# Patient Record
Sex: FEMALE | Race: WHITE | NOT HISPANIC OR LATINO | Employment: FULL TIME | URBAN - NONMETROPOLITAN AREA
[De-identification: names, ages, dates, MRNs, and addresses within clinical notes are randomized per-mention and may not be internally consistent; named-entity substitution may affect disease eponyms.]

---

## 2024-07-07 ENCOUNTER — APPOINTMENT (OUTPATIENT)
Dept: RADIOLOGY | Facility: HOSPITAL | Age: 29
End: 2024-07-07

## 2024-07-07 ENCOUNTER — HOSPITAL ENCOUNTER (EMERGENCY)
Facility: HOSPITAL | Age: 29
Discharge: HOME | End: 2024-07-07
Attending: FAMILY MEDICINE

## 2024-07-07 VITALS
TEMPERATURE: 98 F | DIASTOLIC BLOOD PRESSURE: 85 MMHG | WEIGHT: 190 LBS | BODY MASS INDEX: 32.44 KG/M2 | OXYGEN SATURATION: 99 % | HEIGHT: 64 IN | RESPIRATION RATE: 16 BRPM | SYSTOLIC BLOOD PRESSURE: 129 MMHG | HEART RATE: 73 BPM

## 2024-07-07 DIAGNOSIS — S93.401A SPRAIN OF RIGHT ANKLE, UNSPECIFIED LIGAMENT, INITIAL ENCOUNTER: ICD-10-CM

## 2024-07-07 DIAGNOSIS — S93.601A SPRAIN OF RIGHT FOOT, INITIAL ENCOUNTER: Primary | ICD-10-CM

## 2024-07-07 PROCEDURE — 99284 EMERGENCY DEPT VISIT MOD MDM: CPT

## 2024-07-07 PROCEDURE — 73610 X-RAY EXAM OF ANKLE: CPT | Mod: RT

## 2024-07-07 PROCEDURE — 73610 X-RAY EXAM OF ANKLE: CPT | Mod: RIGHT SIDE | Performed by: RADIOLOGY

## 2024-07-07 PROCEDURE — 73630 X-RAY EXAM OF FOOT: CPT | Mod: RIGHT SIDE | Performed by: RADIOLOGY

## 2024-07-07 PROCEDURE — 73630 X-RAY EXAM OF FOOT: CPT | Mod: RT

## 2024-07-07 ASSESSMENT — PAIN DESCRIPTION - DESCRIPTORS: DESCRIPTORS: THROBBING

## 2024-07-07 ASSESSMENT — COLUMBIA-SUICIDE SEVERITY RATING SCALE - C-SSRS
1. IN THE PAST MONTH, HAVE YOU WISHED YOU WERE DEAD OR WISHED YOU COULD GO TO SLEEP AND NOT WAKE UP?: NO
2. HAVE YOU ACTUALLY HAD ANY THOUGHTS OF KILLING YOURSELF?: NO
6. HAVE YOU EVER DONE ANYTHING, STARTED TO DO ANYTHING, OR PREPARED TO DO ANYTHING TO END YOUR LIFE?: NO

## 2024-07-07 ASSESSMENT — PAIN DESCRIPTION - LOCATION: LOCATION: FOOT

## 2024-07-07 ASSESSMENT — PAIN SCALES - GENERAL: PAINLEVEL_OUTOF10: 7

## 2024-07-07 ASSESSMENT — PAIN DESCRIPTION - ORIENTATION: ORIENTATION: RIGHT

## 2024-07-07 ASSESSMENT — PAIN - FUNCTIONAL ASSESSMENT: PAIN_FUNCTIONAL_ASSESSMENT: 0-10

## 2024-07-07 NOTE — ED TRIAGE NOTES
Pt states yesterday she rolled her R ankle and then when she went to regain her balance, her toes went under her foot, causing pain on the top of the foot. Pt is able to bear weight on the foot, has full ROM. Pt took tylenol at 0830

## 2024-07-07 NOTE — DISCHARGE INSTRUCTIONS
As discussed x-ray showed no fracture or dislocation final report from radiologist pending gentle cool compress affected area Tylenol Motrin for pain and ache if symptoms persist follow-up with orthopedic surgeon.  Also see your primary care physician follow-up.

## 2024-07-07 NOTE — Clinical Note
Diego Camarillo was seen and treated in our emergency department on 7/7/2024.  She may return to work on 07/09/2024.       If you have any questions or concerns, please don't hesitate to call.      Aleyda Andrade MD

## 2024-07-07 NOTE — ED PROVIDER NOTES
HPI   Chief Complaint   Patient presents with    Foot Injury     Pt states yesterday she rolled her R ankle and then when she went to regain her balance, her toes went under her foot, causing pain on the top of the foot. Pt is able to bear weight on the foot, has full ROM. Pt took tylenol at 0830    Ankle Pain       HPI  This 28-year-old female patient came to the emergency room complaining of right ankle and foot pain mostly in the ankle area, she rolled her right ankle when she tried to regain her balance she complains of injury to right foot causing pain in the toes as well as some pain in the ankle.  Pain is worse with range of motion of weightbearing.     Family history: Reviewed  Social history: Reviewed, denies substance abuse.  Review of system: 10 review of system obtained review of systems described in HPI otherwise negative.             Jose Coma Scale Score: 15                     Patient History   History reviewed. No pertinent past medical history.  History reviewed. No pertinent surgical history.  No family history on file.  Social History     Tobacco Use    Smoking status: Never    Smokeless tobacco: Never   Substance Use Topics    Alcohol use: Never    Drug use: Not on file       Physical Exam   ED Triage Vitals [07/07/24 1150]   Temperature Heart Rate Respirations BP   36.7 °C (98 °F) 73 16 129/85      Pulse Ox Temp Source Heart Rate Source Patient Position   99 % Temporal -- --      BP Location FiO2 (%)     -- --       Physical Exam  Constitutional:       Appearance: Normal appearance.      Comments: Patient is awake alert pleasant cooperative petechia right ankle otherwise no evidence of discomfort or distress   HENT:      Head: Normocephalic and atraumatic.      Comments: Head was normocephalic atraumatic no ear nose discharge no facial drooping or drooling no stridor     Right Ear: External ear normal.      Left Ear: External ear normal.      Nose: Nose normal.      Mouth/Throat:      Mouth:  Mucous membranes are moist.   Eyes:      Extraocular Movements: Extraocular movements intact.      Conjunctiva/sclera: Conjunctivae normal.      Pupils: Pupils are equal, round, and reactive to light.   Neck:      Vascular: No carotid bruit.   Cardiovascular:      Rate and Rhythm: Normal rate and regular rhythm.      Pulses: Normal pulses.      Heart sounds: Normal heart sounds. No murmur heard.     No friction rub. No gallop.      Comments: Regular rate and rhythm.  No friction rub no murmur no JVD good peripheral pulses no peripheral edema  Pulmonary:      Effort: Pulmonary effort is normal. No respiratory distress.      Breath sounds: Normal breath sounds. No stridor. No rales.      Comments: Clear breath sound bilaterally without wheezing rales or rhonchi no tachypnea hypoxemia respite distress.  Abdominal:      General: Abdomen is flat. Bowel sounds are normal.      Palpations: Abdomen is soft.      Comments: Abdomen soft positive bowel sound nontender no guarding rebound surgery.   Musculoskeletal:         General: No swelling, tenderness, deformity or signs of injury. Normal range of motion.      Cervical back: Normal range of motion and neck supple. No rigidity or tenderness.      Right lower leg: No edema.      Left lower leg: No edema.      Comments: .  Tarsometatarsal navicular area was nontender.  Mild diffuse tenderness in distal right foot some discomfort right ankle laterally..  Able to wiggle his toes intact DP PT pulses bilaterally..  Achilles tendon intact.  Ankle and mild discomfort more laterally.  Achilles intact no instability ankle joint noted.  Good motion.  Ankle joint bilaterally.  Right ankle examination cause some discomfort though.  Cervical thoracic lumbar spine nontender neck is supple both upper extremity good motion nontender.   Lymphadenopathy:      Cervical: No cervical adenopathy.   Neurological:      General: No focal deficit present.      Mental Status: She is alert and oriented  to person, place, and time.      Cranial Nerves: No cranial nerve deficit.      Sensory: No sensory deficit.      Motor: No weakness.      Coordination: Coordination normal.      Gait: Gait normal.      Deep Tendon Reflexes: Reflexes normal.   Psychiatric:         Mood and Affect: Mood normal.         Behavior: Behavior normal.         ED Course & MDM   Diagnoses as of 08/05/24 2315   Sprain of right foot, initial encounter   Sprain of right ankle, unspecified ligament, initial encounter       Medical Decision Making  Patient reported emergency room with a complaint of right ankle injury noted a mild discomfort upon palpation ankle however tarsometatarsal navicular area was nontender able to wiggle her toes intact DP PT pulses.  Calf is nontender Achilles intact good motion hip joint as well as bilateral knee joint spine.  Cervical thoracic and lumbar spine nontender neck was supple both upper EXTR during motion nontender.  No evidence of foot drop.    Cardiovascular examination unremarkable chest wall nontender abdomen soft and nontender clear breath sound bilaterally without wheezing rales.  Heart regular rate and rhythm.  Tach intact distal pulse intact sensation  Right foot and ankle x-ray showed no fracture or dislocation.  She was treated with an air splint rest increase fluid Tylenol for pain if any problem concern return.  Follow-up primary care physician as well as orthopedic surgeon.  Splint applied by the nursing staff checked the neurofunction intact neurovascular function and good immobilization.  Discharge home stable  Procedure  Procedures     Aleyda Andrade MD  08/05/24 5421       Aleyda Andrade MD  08/27/24 0146

## 2024-09-20 ENCOUNTER — HOSPITAL ENCOUNTER (EMERGENCY)
Facility: HOSPITAL | Age: 29
Discharge: HOME | End: 2024-09-20

## 2024-09-20 ENCOUNTER — APPOINTMENT (OUTPATIENT)
Dept: RADIOLOGY | Facility: HOSPITAL | Age: 29
End: 2024-09-20

## 2024-09-20 VITALS
HEART RATE: 100 BPM | HEIGHT: 63 IN | BODY MASS INDEX: 33.66 KG/M2 | SYSTOLIC BLOOD PRESSURE: 143 MMHG | DIASTOLIC BLOOD PRESSURE: 87 MMHG | RESPIRATION RATE: 12 BRPM | WEIGHT: 190 LBS | TEMPERATURE: 97.9 F | OXYGEN SATURATION: 98 %

## 2024-09-20 DIAGNOSIS — M54.50 ACUTE LEFT-SIDED LOW BACK PAIN WITHOUT SCIATICA: Primary | ICD-10-CM

## 2024-09-20 DIAGNOSIS — R39.9 URINARY TRACT INFECTION SYMPTOMS: ICD-10-CM

## 2024-09-20 LAB
ALBUMIN SERPL BCP-MCNC: 4.7 G/DL (ref 3.4–5)
ALP SERPL-CCNC: 62 U/L (ref 33–110)
ALT SERPL W P-5'-P-CCNC: 10 U/L (ref 7–45)
ANION GAP SERPL CALC-SCNC: 12 MMOL/L (ref 10–20)
APPEARANCE UR: CLEAR
AST SERPL W P-5'-P-CCNC: 18 U/L (ref 9–39)
B-HCG SERPL-ACNC: <2 MIU/ML
BACTERIA #/AREA URNS AUTO: ABNORMAL /HPF
BASOPHILS # BLD AUTO: 0.07 X10*3/UL (ref 0–0.1)
BASOPHILS NFR BLD AUTO: 1 %
BILIRUB SERPL-MCNC: 0.9 MG/DL (ref 0–1.2)
BILIRUB UR STRIP.AUTO-MCNC: NEGATIVE MG/DL
BUN SERPL-MCNC: 8 MG/DL (ref 6–23)
CALCIUM SERPL-MCNC: 9.8 MG/DL (ref 8.6–10.3)
CHLORIDE SERPL-SCNC: 104 MMOL/L (ref 98–107)
CO2 SERPL-SCNC: 26 MMOL/L (ref 21–32)
COLOR UR: YELLOW
CREAT SERPL-MCNC: 0.78 MG/DL (ref 0.5–1.05)
EGFRCR SERPLBLD CKD-EPI 2021: >90 ML/MIN/1.73M*2
EOSINOPHIL # BLD AUTO: 0.04 X10*3/UL (ref 0–0.7)
EOSINOPHIL NFR BLD AUTO: 0.6 %
ERYTHROCYTE [DISTWIDTH] IN BLOOD BY AUTOMATED COUNT: 12 % (ref 11.5–14.5)
GLUCOSE SERPL-MCNC: 103 MG/DL (ref 74–99)
GLUCOSE UR STRIP.AUTO-MCNC: NORMAL MG/DL
HCT VFR BLD AUTO: 41.8 % (ref 36–46)
HGB BLD-MCNC: 14.6 G/DL (ref 12–16)
IMM GRANULOCYTES # BLD AUTO: 0.02 X10*3/UL (ref 0–0.7)
IMM GRANULOCYTES NFR BLD AUTO: 0.3 % (ref 0–0.9)
KETONES UR STRIP.AUTO-MCNC: ABNORMAL MG/DL
LEUKOCYTE ESTERASE UR QL STRIP.AUTO: NEGATIVE
LYMPHOCYTES # BLD AUTO: 2.31 X10*3/UL (ref 1.2–4.8)
LYMPHOCYTES NFR BLD AUTO: 33.9 %
MAGNESIUM SERPL-MCNC: 2.05 MG/DL (ref 1.6–2.4)
MCH RBC QN AUTO: 31.3 PG (ref 26–34)
MCHC RBC AUTO-ENTMCNC: 34.9 G/DL (ref 32–36)
MCV RBC AUTO: 90 FL (ref 80–100)
MONOCYTES # BLD AUTO: 0.6 X10*3/UL (ref 0.1–1)
MONOCYTES NFR BLD AUTO: 8.8 %
MUCOUS THREADS #/AREA URNS AUTO: ABNORMAL /LPF
NEUTROPHILS # BLD AUTO: 3.77 X10*3/UL (ref 1.2–7.7)
NEUTROPHILS NFR BLD AUTO: 55.4 %
NITRITE UR QL STRIP.AUTO: NEGATIVE
NRBC BLD-RTO: 0 /100 WBCS (ref 0–0)
PH UR STRIP.AUTO: 6.5 [PH]
PLATELET # BLD AUTO: 315 X10*3/UL (ref 150–450)
POTASSIUM SERPL-SCNC: 4.6 MMOL/L (ref 3.5–5.3)
PROT SERPL-MCNC: 7.6 G/DL (ref 6.4–8.2)
PROT UR STRIP.AUTO-MCNC: ABNORMAL MG/DL
RBC # BLD AUTO: 4.66 X10*6/UL (ref 4–5.2)
RBC # UR STRIP.AUTO: NEGATIVE /UL
RBC #/AREA URNS AUTO: ABNORMAL /HPF
SODIUM SERPL-SCNC: 137 MMOL/L (ref 136–145)
SP GR UR STRIP.AUTO: 1.03
SQUAMOUS #/AREA URNS AUTO: ABNORMAL /HPF
UROBILINOGEN UR STRIP.AUTO-MCNC: ABNORMAL MG/DL
WBC # BLD AUTO: 6.8 X10*3/UL (ref 4.4–11.3)
WBC #/AREA URNS AUTO: ABNORMAL /HPF

## 2024-09-20 PROCEDURE — 85025 COMPLETE CBC W/AUTO DIFF WBC: CPT

## 2024-09-20 PROCEDURE — 2500000004 HC RX 250 GENERAL PHARMACY W/ HCPCS (ALT 636 FOR OP/ED)

## 2024-09-20 PROCEDURE — 81001 URINALYSIS AUTO W/SCOPE: CPT

## 2024-09-20 PROCEDURE — 74176 CT ABD & PELVIS W/O CONTRAST: CPT

## 2024-09-20 PROCEDURE — 99285 EMERGENCY DEPT VISIT HI MDM: CPT | Mod: 25

## 2024-09-20 PROCEDURE — 83735 ASSAY OF MAGNESIUM: CPT

## 2024-09-20 PROCEDURE — 80053 COMPREHEN METABOLIC PANEL: CPT

## 2024-09-20 PROCEDURE — 93975 VASCULAR STUDY: CPT

## 2024-09-20 PROCEDURE — 74176 CT ABD & PELVIS W/O CONTRAST: CPT | Performed by: RADIOLOGY

## 2024-09-20 PROCEDURE — 76830 TRANSVAGINAL US NON-OB: CPT | Performed by: RADIOLOGY

## 2024-09-20 PROCEDURE — 96361 HYDRATE IV INFUSION ADD-ON: CPT

## 2024-09-20 PROCEDURE — 36415 COLL VENOUS BLD VENIPUNCTURE: CPT

## 2024-09-20 PROCEDURE — 84702 CHORIONIC GONADOTROPIN TEST: CPT

## 2024-09-20 PROCEDURE — 96375 TX/PRO/DX INJ NEW DRUG ADDON: CPT

## 2024-09-20 PROCEDURE — 76856 US EXAM PELVIC COMPLETE: CPT

## 2024-09-20 PROCEDURE — 96374 THER/PROPH/DIAG INJ IV PUSH: CPT

## 2024-09-20 PROCEDURE — 76856 US EXAM PELVIC COMPLETE: CPT | Performed by: RADIOLOGY

## 2024-09-20 RX ORDER — ONDANSETRON HYDROCHLORIDE 2 MG/ML
4 INJECTION, SOLUTION INTRAVENOUS ONCE
Status: COMPLETED | OUTPATIENT
Start: 2024-09-20 | End: 2024-09-20

## 2024-09-20 RX ORDER — ACETAMINOPHEN 325 MG/1
975 TABLET ORAL ONCE
Status: COMPLETED | OUTPATIENT
Start: 2024-09-20 | End: 2024-09-20

## 2024-09-20 RX ORDER — KETOROLAC TROMETHAMINE 30 MG/ML
30 INJECTION, SOLUTION INTRAMUSCULAR; INTRAVENOUS ONCE
Status: COMPLETED | OUTPATIENT
Start: 2024-09-20 | End: 2024-09-20

## 2024-09-20 ASSESSMENT — PAIN SCALES - GENERAL
PAINLEVEL_OUTOF10: 4
PAINLEVEL_OUTOF10: 0 - NO PAIN
PAINLEVEL_OUTOF10: 7

## 2024-09-20 ASSESSMENT — PAIN - FUNCTIONAL ASSESSMENT
PAIN_FUNCTIONAL_ASSESSMENT: 0-10
PAIN_FUNCTIONAL_ASSESSMENT: 0-10

## 2024-09-20 ASSESSMENT — PAIN DESCRIPTION - PAIN TYPE: TYPE: ACUTE PAIN

## 2024-09-20 ASSESSMENT — PAIN DESCRIPTION - ORIENTATION: ORIENTATION: LEFT

## 2024-09-20 ASSESSMENT — PAIN DESCRIPTION - FREQUENCY: FREQUENCY: INTERMITTENT

## 2024-09-20 ASSESSMENT — PAIN DESCRIPTION - DESCRIPTORS: DESCRIPTORS: ACHING

## 2024-09-20 NOTE — ED TRIAGE NOTES
Patient complaining of back pain that radiates around left side to left groin , patient seen at obn Tuesday , checked patient for UTI , STD all were negative .

## 2024-09-20 NOTE — ED PROVIDER NOTES
HPI   Chief Complaint   Patient presents with    Back Pain     Back pain       Patient is a 28-year-old female presenting to the ED with cc of left lower quadrant abdominal pain rating to her back.  Patient states this began last week.  Patient states it is somewhat improved since last week.  Patient saw her OB/GYN yesterday who did urine test and STD test that were all negative.  Patient states she is having dysuria urinary frequency urgency denies any vaginal discharge.  Patient denies any abdominal surgeries.  Patient denies any history of diverticulitis or kidney stones.  Patient denies any known fevers congestion cough.  Patient endorses nausea and gagging.  Patient has had decreased p.o. intake and decreased appetite.  Patient denies any chest pain shortness of breath.  Patient states she did have some diarrhea.  Patient denies any tobacco alcohol or street drug abuse              Patient History   No past medical history on file.  No past surgical history on file.  No family history on file.  Social History     Tobacco Use    Smoking status: Never    Smokeless tobacco: Never   Substance Use Topics    Alcohol use: Never    Drug use: Not on file       Physical Exam   ED Triage Vitals [09/20/24 1551]   Temperature Heart Rate Respirations BP   36.6 °C (97.9 °F) 100 12 143/87      Pulse Ox Temp Source Heart Rate Source Patient Position   98 % Tympanic -- Sitting      BP Location FiO2 (%)     Left arm --       Physical Exam  HENT:      Head: Normocephalic.   Cardiovascular:      Rate and Rhythm: Normal rate and regular rhythm.      Pulses: Normal pulses.   Pulmonary:      Effort: Pulmonary effort is normal.      Breath sounds: No wheezing, rhonchi or rales.   Abdominal:      Palpations: Abdomen is soft.      Tenderness: There is abdominal tenderness. There is no right CVA tenderness, left CVA tenderness, guarding or rebound.   Neurological:      General: No focal deficit present.      Mental Status: She is alert  and oriented to person, place, and time. Mental status is at baseline.           ED Course & MDM   Diagnoses as of 09/20/24 2053   Acute left-sided low back pain without sciatica   Urinary tract infection symptoms                 No data recorded                                 Medical Decision Making  Medical Decision Making:  Patient presented as described in HPI. Patient case including ROS, PE, and treatment and plan discussed with ED attending if attached as cosigner. Due to patients presentation orders completed include as documented.  Patient presents to the ED with cc of lower abdominal pain.  Patient denies any history of kidney stones.  Patient denies any injury to the area.  Patient is nontoxic-appearing abdomen is soft and tender to the left lower quadrant no CVA tenderness lung sounds are clear.  Patient given fluids Zofran Toradol for symptoms.  Labs are unremarkable.  Ultrasound pelvis is unremarkable.  Does show PCOS morphology in the left ovary.  Pending CT scan.  CT scan is unremarkable.  They do states possible adhesions.  Patient educated on follow-up with OB/GYN.  Patient educated any worsening symptoms to return.  Patient cathy stable and discharged  Patient was advised to follow up with PCP or recommended provider in 2-3 days for another evaluation and exam. I advised patient/guardian to return or go to closest emergency room immediately if symptoms change, get worse, new symptoms develop prior to follow up. If there is no improvement in symptoms in the next 24 hours they are advised to return for further evaluation and exam. I also explained the plan and treatment course. Patient/guardian is in agreement with plan, treatment course, and follow up and states verbally that they will comply.      Patient care discussed with: N/A  Social Determinants affecting care: N/A    Final diagnosis and disposition as below.  See CI    Homegoing. I discussed the differential; results and discharge plan with  the patient and/or family/friend/caregiver if present.  I emphasized the importance of follow-up with the physician I referred them to in the timeframe recommended.  I explained reasons for the patient to return to the Emergency Department. They agreed that if they feel their condition is worsening or if they have any other concern they should call 911 immediately for further assistance. I gave the patient an opportunity to ask all questions they had and answered all of them accordingly. They understand return precautions and discharge instructions. The patient and/or family/friend/caregiver expressed understanding verbally and that they would comply.       Disposition:  Discharge      This note has been transcribed using voice recognition and may contain grammatical errors, misplaced words, incorrect words, incorrect phrases or other errors.        Labs Reviewed   COMPREHENSIVE METABOLIC PANEL - Abnormal       Result Value    Glucose 103 (*)     Sodium 137      Potassium 4.6      Chloride 104      Bicarbonate 26      Anion Gap 12      Urea Nitrogen 8      Creatinine 0.78      eGFR >90      Calcium 9.8      Albumin 4.7      Alkaline Phosphatase 62      Total Protein 7.6      AST 18      Bilirubin, Total 0.9      ALT 10     URINALYSIS WITH REFLEX CULTURE AND MICROSCOPIC - Abnormal    Color, Urine Yellow      Appearance, Urine Clear      Specific Gravity, Urine 1.026      pH, Urine 6.5      Protein, Urine 20 (TRACE)      Glucose, Urine Normal      Blood, Urine NEGATIVE      Ketones, Urine 10 (1+) (*)     Bilirubin, Urine NEGATIVE      Urobilinogen, Urine 6 (2+) (*)     Nitrite, Urine NEGATIVE      Leukocyte Esterase, Urine NEGATIVE     URINALYSIS MICROSCOPIC WITH REFLEX CULTURE - Abnormal    WBC, Urine 1-5      RBC, Urine NONE      Squamous Epithelial Cells, Urine 1-9 (SPARSE)      Bacteria, Urine 3+ (*)     Mucus, Urine 2+     MAGNESIUM - Normal    Magnesium 2.05     HUMAN CHORIONIC GONADOTROPIN, SERUM QUANTITATIVE -  Normal    HCG, Beta-Quantitative <2      Narrative:      Total HCG measurement is performed using the [employer] Access   Immunoassay which detects intact HCG and free beta HCG subunit.    This test is not indicated for use as a tumor marker.   HCG testing is performed using a different test methodology at [facility] than other [facility]. Direct result comparison   should only be made within the same method.       CBC WITH AUTO DIFFERENTIAL    WBC 6.8      nRBC 0.0      RBC 4.66      Hemoglobin 14.6      Hematocrit 41.8      MCV 90      MCH 31.3      MCHC 34.9      RDW 12.0      Platelets 315      Neutrophils % 55.4      Immature Granulocytes %, Automated 0.3      Lymphocytes % 33.9      Monocytes % 8.8      Eosinophils % 0.6      Basophils % 1.0      Neutrophils Absolute 3.77      Immature Granulocytes Absolute, Automated 0.02      Lymphocytes Absolute 2.31      Monocytes Absolute 0.60      Eosinophils Absolute 0.04      Basophils Absolute 0.07     URINALYSIS WITH REFLEX CULTURE AND MICROSCOPIC    Narrative:     The following orders were created for panel order Urinalysis with Reflex Culture and Microscopic.  Procedure                               Abnormality         Status                     ---------                               -----------         ------                     Urinalysis with Reflex Cu...[[id number]]  Abnormal            Final result               Extra Urine Gray Tube[[id number]]                             In process                   Please view results for these tests on the individual orders.   EXTRA URINE GRAY TUBE      CT abdomen pelvis wo IV contrast   Final Result   No acute abdominal or pelvic process.        Additional findings as noted above.        MACRO:   None        Signed by: Rere Delaney 9/20/2024 7:45 PM   Dictation workstation:   [id number]      US PELVIS TRANSABDOMINAL WITH TRANSVAGINAL   Final Result   Left ovary demonstrates PCOS morphology.         Right ovary not identified. No evident right adnexal mass.        Minimal fluid within the cervix canal.        MACRO:   None        Signed by: Ad Rush 9/20/2024 6:36 PM   Dictation workstation:   [id number]           Procedure  Procedures     Emmie Rivera PA-C  09/20/24 2053

## 2024-09-21 LAB — HOLD SPECIMEN: NORMAL

## 2024-09-21 NOTE — DISCHARGE INSTRUCTIONS
Possible adhesion seen on imaging.  Follow-up with OB/GYN for this.  PCOS seen on the left ovary again follow-up with OB/GYN for this.  Continue ibuprofen Tylenol for home any worse symptoms return